# Patient Record
Sex: MALE | Race: WHITE | Employment: FULL TIME | URBAN - METROPOLITAN AREA
[De-identification: names, ages, dates, MRNs, and addresses within clinical notes are randomized per-mention and may not be internally consistent; named-entity substitution may affect disease eponyms.]

---
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
## 2020-06-26 ENCOUNTER — APPOINTMENT (OUTPATIENT)
Dept: GENERAL RADIOLOGY | Age: 20
End: 2020-06-26
Attending: PHYSICIAN ASSISTANT
Payer: COMMERCIAL

## 2020-06-26 ENCOUNTER — HOSPITAL ENCOUNTER (EMERGENCY)
Age: 20
Discharge: OTHER INSTITUTION | End: 2020-06-26
Attending: EMERGENCY MEDICINE | Admitting: EMERGENCY MEDICINE
Payer: COMMERCIAL

## 2020-06-26 VITALS
RESPIRATION RATE: 16 BRPM | SYSTOLIC BLOOD PRESSURE: 134 MMHG | DIASTOLIC BLOOD PRESSURE: 74 MMHG | WEIGHT: 290 LBS | OXYGEN SATURATION: 96 % | BODY MASS INDEX: 39.28 KG/M2 | HEIGHT: 72 IN | TEMPERATURE: 97.5 F | HEART RATE: 105 BPM
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
## 2020-06-26 DIAGNOSIS — R40.20 LOC (LOSS OF CONSCIOUSNESS) (HCC): Primary | ICD-10-CM

## 2020-06-26 PROCEDURE — 74011250636 HC RX REV CODE- 250/636: Performed by: PHYSICIAN ASSISTANT

## 2020-06-26 PROCEDURE — 99285 EMERGENCY DEPT VISIT HI MDM: CPT

## 2020-06-26 PROCEDURE — 71045 X-RAY EXAM CHEST 1 VIEW: CPT

## 2020-06-26 PROCEDURE — 96374 THER/PROPH/DIAG INJ IV PUSH: CPT

## 2020-06-26 RX ORDER — FENTANYL CITRATE 50 UG/ML
50 INJECTION, SOLUTION INTRAMUSCULAR; INTRAVENOUS
Status: COMPLETED | OUTPATIENT
Start: 2020-06-26 | End: 2020-06-26

## 2020-06-26 RX ADMIN — FENTANYL CITRATE 50 MCG: 50 INJECTION, SOLUTION INTRAMUSCULAR; INTRAVENOUS at 13:59
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
## 2020-06-26 NOTE — ED NOTES
[facility] arrived to transport patient at this time, all documentation sent with [facility] including pcs and emtala, xray

## 2020-06-26 NOTE — ED NOTES
TRANSFER - OUT REPORT:    Verbal report given to Yamileth (name) on Angelita Palomares  being transferred to [facility] Trauma (unit) for routine progression of care       Report consisted of patients Situation, Background, Assessment and   Recommendations(SBAR). Information from the following report(s) SBAR, ED Summary, [facility] and Recent Results was reviewed with the receiving nurse. Lines:   Peripheral IV 06/26/20 Right Antecubital (Active)   Site Assessment Clean, dry, & intact 6/26/2020  1:55 PM   Phlebitis Assessment 0 6/26/2020  1:55 PM   Infiltration Assessment 0 6/26/2020  1:55 PM   Dressing Status Clean, dry, & intact 6/26/2020  1:55 PM   Dressing Type Tape;Transparent 6/26/2020  1:55 PM   Hub Color/Line Status Green;Flushed;Patent 6/26/2020  1:55 PM       Peripheral IV 06/26/20 Left Antecubital (Active)   Site Assessment Clean, dry, & intact 6/26/2020  1:55 PM   Phlebitis Assessment 0 6/26/2020  1:55 PM   Infiltration Assessment 0 6/26/2020  1:55 PM   Dressing Status Clean, dry, & intact 6/26/2020  1:55 PM   Dressing Type Tape;Transparent 6/26/2020  1:55 PM   Hub Color/Line Status Green 6/26/2020  1:55 PM        Opportunity for questions and clarification was provided.       Patient transported with:  [facility]